# Patient Record
Sex: FEMALE | URBAN - METROPOLITAN AREA
[De-identification: names, ages, dates, MRNs, and addresses within clinical notes are randomized per-mention and may not be internally consistent; named-entity substitution may affect disease eponyms.]

---

## 2021-01-11 ENCOUNTER — TELEPHONE ENCOUNTER (OUTPATIENT)
Dept: URBAN - METROPOLITAN AREA CLINIC 98 | Facility: CLINIC | Age: 41
End: 2021-01-11

## 2021-01-12 ENCOUNTER — TELEPHONE ENCOUNTER (OUTPATIENT)
Dept: URBAN - METROPOLITAN AREA CLINIC 98 | Facility: CLINIC | Age: 41
End: 2021-01-12

## 2021-01-13 ENCOUNTER — OFFICE VISIT (OUTPATIENT)
Dept: URBAN - METROPOLITAN AREA TELEHEALTH 2 | Facility: TELEHEALTH | Age: 41
End: 2021-01-13
Payer: MEDICARE

## 2021-01-13 DIAGNOSIS — K57.92 DIVERTICULITIS: ICD-10-CM

## 2021-01-13 DIAGNOSIS — K57.32 DIVERTICULITIS LARGE INTESTINE: ICD-10-CM

## 2021-01-13 DIAGNOSIS — Z12.11 COLON CANCER SCREENING: ICD-10-CM

## 2021-01-13 PROCEDURE — G8482 FLU IMMUNIZE ORDER/ADMIN: HCPCS | Performed by: INTERNAL MEDICINE

## 2021-01-13 PROCEDURE — G8427 DOCREV CUR MEDS BY ELIG CLIN: HCPCS | Performed by: INTERNAL MEDICINE

## 2021-01-13 PROCEDURE — G9903 PT SCRN TBCO ID AS NON USER: HCPCS | Performed by: INTERNAL MEDICINE

## 2021-01-13 PROCEDURE — 3017F COLORECTAL CA SCREEN DOC REV: CPT | Performed by: INTERNAL MEDICINE

## 2021-01-13 PROCEDURE — 99214 OFFICE O/P EST MOD 30 MIN: CPT | Performed by: INTERNAL MEDICINE

## 2021-01-13 PROCEDURE — G9622 NO UNHEAL ETOH USER: HCPCS | Performed by: INTERNAL MEDICINE

## 2021-01-13 PROCEDURE — G8417 CALC BMI ABV UP PARAM F/U: HCPCS | Performed by: INTERNAL MEDICINE

## 2021-01-13 PROCEDURE — 1036F TOBACCO NON-USER: CPT | Performed by: INTERNAL MEDICINE

## 2021-01-13 RX ORDER — MOXIFLOXACIN HYDROCHLORIDE 400 MG/1
1 TABLET TABLET, FILM COATED ORAL ONCE A DAY
Qty: 10 | OUTPATIENT
Start: 2021-01-13 | End: 2021-01-23

## 2021-01-13 RX ORDER — ONDANSETRON HYDROCHLORIDE 4 MG/1
TABLET, FILM COATED ORAL
Qty: 0 | Refills: 0 | Status: ACTIVE | COMMUNITY
Start: 1900-01-01

## 2021-01-13 NOTE — HPI-OTHER HISTORIES
f/u visit. Recently at [facility] with recurrent diverticulitis.   ED records reviewed Prior episodes reviewed.  Difficulty with multiple antibiotics in the past. She was started on Augmentin in the ED, however, she reports an allergic reaction to this in the past Per my records, she did respond to Avalox  in the past Reviewed prior colonoscopy with poor prep

## 2021-01-13 NOTE — EXAM-PHYSICAL EXAM
[facility] CT SCAN 1/9/21: IMPRESSION:  1. Early acute, uncomplicated diverticulitis of the distal descending colon/proximal sigmoid colon within the lower abdomen, just left of midline. 2. The appendix is normal.

## 2021-01-17 ENCOUNTER — WEB ENCOUNTER (OUTPATIENT)
Dept: URBAN - METROPOLITAN AREA CLINIC 98 | Facility: CLINIC | Age: 41
End: 2021-01-17

## 2021-01-19 ENCOUNTER — WEB ENCOUNTER (OUTPATIENT)
Dept: URBAN - METROPOLITAN AREA CLINIC 98 | Facility: CLINIC | Age: 41
End: 2021-01-19

## 2021-01-21 ENCOUNTER — TELEPHONE ENCOUNTER (OUTPATIENT)
Dept: URBAN - METROPOLITAN AREA CLINIC 98 | Facility: CLINIC | Age: 41
End: 2021-01-21

## 2021-01-25 ENCOUNTER — TELEPHONE ENCOUNTER (OUTPATIENT)
Dept: URBAN - METROPOLITAN AREA CLINIC 98 | Facility: CLINIC | Age: 41
End: 2021-01-25

## 2021-02-17 ENCOUNTER — LAB OUTSIDE AN ENCOUNTER (OUTPATIENT)
Dept: URBAN - METROPOLITAN AREA TELEHEALTH 2 | Facility: TELEHEALTH | Age: 41
End: 2021-02-17

## 2021-02-17 ENCOUNTER — OFFICE VISIT (OUTPATIENT)
Dept: URBAN - METROPOLITAN AREA TELEHEALTH 2 | Facility: TELEHEALTH | Age: 41
End: 2021-02-17
Payer: MEDICARE

## 2021-02-17 ENCOUNTER — WEB ENCOUNTER (OUTPATIENT)
Dept: URBAN - METROPOLITAN AREA CLINIC 98 | Facility: CLINIC | Age: 41
End: 2021-02-17

## 2021-02-17 DIAGNOSIS — K57.92 DIVERTICULITIS: ICD-10-CM

## 2021-02-17 DIAGNOSIS — Z12.11 COLON CANCER SCREENING: ICD-10-CM

## 2021-02-17 PROCEDURE — G8482 FLU IMMUNIZE ORDER/ADMIN: HCPCS | Performed by: INTERNAL MEDICINE

## 2021-02-17 PROCEDURE — 1036F TOBACCO NON-USER: CPT | Performed by: INTERNAL MEDICINE

## 2021-02-17 PROCEDURE — G9903 PT SCRN TBCO ID AS NON USER: HCPCS | Performed by: INTERNAL MEDICINE

## 2021-02-17 PROCEDURE — 3017F COLORECTAL CA SCREEN DOC REV: CPT | Performed by: INTERNAL MEDICINE

## 2021-02-17 PROCEDURE — G8417 CALC BMI ABV UP PARAM F/U: HCPCS | Performed by: INTERNAL MEDICINE

## 2021-02-17 PROCEDURE — 99214 OFFICE O/P EST MOD 30 MIN: CPT | Performed by: INTERNAL MEDICINE

## 2021-02-17 PROCEDURE — G8427 DOCREV CUR MEDS BY ELIG CLIN: HCPCS | Performed by: INTERNAL MEDICINE

## 2021-02-17 RX ORDER — ONDANSETRON HYDROCHLORIDE 4 MG/1
TABLET, FILM COATED ORAL
Qty: 0 | Refills: 0 | Status: ACTIVE | COMMUNITY
Start: 1900-01-01

## 2021-02-17 NOTE — HPI-OTHER HISTORIES
f/u visit. Recently at [facility] with recurrent diverticulitis.   ED records reviewed Prior episodes reviewed.  Difficulty with multiple antibiotics in the past. She was started on Augmentin in the ED, however, she reports an allergic reaction to this in the past Per my records, she did respond to Avalox  in the past Reviewed prior colonoscopy with poor prep

## 2021-02-18 ENCOUNTER — WEB ENCOUNTER (OUTPATIENT)
Dept: URBAN - METROPOLITAN AREA CLINIC 98 | Facility: CLINIC | Age: 41
End: 2021-02-18

## 2021-02-19 ENCOUNTER — WEB ENCOUNTER (OUTPATIENT)
Dept: URBAN - METROPOLITAN AREA CLINIC 98 | Facility: CLINIC | Age: 41
End: 2021-02-19

## 2021-04-12 ENCOUNTER — OFFICE VISIT (OUTPATIENT)
Dept: URBAN - METROPOLITAN AREA SURGERY CENTER 18 | Facility: SURGERY CENTER | Age: 41
End: 2021-04-12
Payer: MEDICARE

## 2021-04-12 DIAGNOSIS — K63.89 BACTERIAL OVERGROWTH SYNDROME: ICD-10-CM

## 2021-04-12 DIAGNOSIS — Z87.19 H/O ACUTE PANCREATITIS: ICD-10-CM

## 2021-04-12 PROCEDURE — 45380 COLONOSCOPY AND BIOPSY: CPT | Performed by: INTERNAL MEDICINE

## 2021-04-12 PROCEDURE — G8907 PT DOC NO EVENTS ON DISCHARG: HCPCS | Performed by: INTERNAL MEDICINE

## 2022-03-29 ENCOUNTER — OFFICE VISIT (OUTPATIENT)
Dept: URBAN - METROPOLITAN AREA CLINIC 98 | Facility: CLINIC | Age: 42
End: 2022-03-29
Payer: MEDICARE

## 2022-03-29 ENCOUNTER — LAB OUTSIDE AN ENCOUNTER (OUTPATIENT)
Dept: URBAN - METROPOLITAN AREA CLINIC 98 | Facility: CLINIC | Age: 42
End: 2022-03-29

## 2022-03-29 VITALS
SYSTOLIC BLOOD PRESSURE: 112 MMHG | HEART RATE: 69 BPM | BODY MASS INDEX: 36.61 KG/M2 | DIASTOLIC BLOOD PRESSURE: 69 MMHG | TEMPERATURE: 97.5 F | WEIGHT: 227.8 LBS | HEIGHT: 66 IN

## 2022-03-29 DIAGNOSIS — K57.92 DIVERTICULITIS: ICD-10-CM

## 2022-03-29 DIAGNOSIS — Z12.11 COLON CANCER SCREENING: ICD-10-CM

## 2022-03-29 DIAGNOSIS — R10.30 LOWER ABDOMINAL PAIN: ICD-10-CM

## 2022-03-29 PROBLEM — 54586004: Status: ACTIVE | Noted: 2022-03-29

## 2022-03-29 PROBLEM — 307496006 DIVERTICULITIS: Status: ACTIVE | Noted: 2021-01-13

## 2022-03-29 PROCEDURE — G8482 FLU IMMUNIZE ORDER/ADMIN: HCPCS | Performed by: INTERNAL MEDICINE

## 2022-03-29 PROCEDURE — 99214 OFFICE O/P EST MOD 30 MIN: CPT | Performed by: INTERNAL MEDICINE

## 2022-03-29 PROCEDURE — 3017F COLORECTAL CA SCREEN DOC REV: CPT | Performed by: INTERNAL MEDICINE

## 2022-03-29 PROCEDURE — G8417 CALC BMI ABV UP PARAM F/U: HCPCS | Performed by: INTERNAL MEDICINE

## 2022-03-29 PROCEDURE — G9903 PT SCRN TBCO ID AS NON USER: HCPCS | Performed by: INTERNAL MEDICINE

## 2022-03-29 PROCEDURE — G8427 DOCREV CUR MEDS BY ELIG CLIN: HCPCS | Performed by: INTERNAL MEDICINE

## 2022-03-29 RX ORDER — ONDANSETRON HYDROCHLORIDE 4 MG/1
TABLET, FILM COATED ORAL
Qty: 0 | Refills: 0 | Status: ACTIVE | COMMUNITY
Start: 1900-01-01

## 2022-03-29 NOTE — HPI-OTHER HISTORIES
f/u visit. Colonoscopy 4/2021 with pan-diverticulosis and lipoma She recently had some RLQ abd pain She took Aloe Vera, and the pain moved to the LLQ She now feels like pain starts in RLQ and moves to LLQ Pain does improve after a bowel movement She does have a bowel movement every day, actually 3-4/day No blood or mucus    . PRIOR VISIT: Recently at [facility] with recurrent diverticulitis.   ED records reviewed Prior episodes reviewed.  Difficulty with multiple antibiotics in the past. She was started on Augmentin in the ED, however, she reports an allergic reaction to this in the past Per my records, she did respond to Avalox  in the past Reviewed prior colonoscopy with poor prep

## 2022-03-30 LAB
A/G RATIO: 2.2
ALBUMIN: 4.6
ALKALINE PHOSPHATASE: 58
ALT (SGPT): 20
AST (SGOT): 19
BASO (ABSOLUTE): 0
BASOS: 1
BILIRUBIN, TOTAL: 0.4
BUN/CREATININE RATIO: 15
BUN: 12
C-REACTIVE PROTEIN, QUANT: 3
CALCIUM: 9.7
CARBON DIOXIDE, TOTAL: 19
CHLORIDE: 104
CREATININE: 0.82
EGFR: 91
EOS (ABSOLUTE): 0.2
EOS: 5
GLOBULIN, TOTAL: 2.1
GLUCOSE: 90
HEMATOCRIT: 40.2
HEMATOLOGY COMMENTS:: (no result)
HEMOGLOBIN: 12.6
IMMATURE CELLS: (no result)
IMMATURE GRANS (ABS): 0
IMMATURE GRANULOCYTES: 0
LYMPHS (ABSOLUTE): 1.7
LYMPHS: 43
MCH: 25.7
MCHC: 31.3
MCV: 82
MONOCYTES(ABSOLUTE): 0.5
MONOCYTES: 12
NEUTROPHILS (ABSOLUTE): 1.5
NEUTROPHILS: 39
NRBC: (no result)
PLATELETS: 287
POTASSIUM: 4.3
PROTEIN, TOTAL: 6.7
RBC: 4.9
RDW: 13.4
SODIUM: 149
WBC: 3.9

## 2022-04-15 ENCOUNTER — TELEPHONE ENCOUNTER (OUTPATIENT)
Dept: URBAN - METROPOLITAN AREA CLINIC 98 | Facility: CLINIC | Age: 42
End: 2022-04-15

## 2022-04-20 ENCOUNTER — TELEPHONE ENCOUNTER (OUTPATIENT)
Dept: URBAN - METROPOLITAN AREA CLINIC 98 | Facility: CLINIC | Age: 42
End: 2022-04-20

## 2023-01-16 ENCOUNTER — APPOINTMENT (RX ONLY)
Dept: URBAN - METROPOLITAN AREA CLINIC 33 | Facility: CLINIC | Age: 43
Setting detail: DERMATOLOGY
End: 2023-01-16

## 2023-01-16 DIAGNOSIS — L81.0 POSTINFLAMMATORY HYPERPIGMENTATION: ICD-10-CM

## 2023-01-16 PROCEDURE — 99202 OFFICE O/P NEW SF 15 MIN: CPT

## 2023-01-16 PROCEDURE — ? TREATMENT REGIMEN

## 2023-01-16 PROCEDURE — ? COUNSELING

## 2023-01-16 ASSESSMENT — LOCATION SIMPLE DESCRIPTION DERM
LOCATION SIMPLE: RIGHT SUPERIOR EYELID
LOCATION SIMPLE: LEFT INFERIOR EYELID
LOCATION SIMPLE: LEFT SUPERIOR EYELID
LOCATION SIMPLE: RIGHT INFERIOR EYELID

## 2023-01-16 ASSESSMENT — LOCATION DETAILED DESCRIPTION DERM
LOCATION DETAILED: RIGHT LATERAL SUPERIOR EYELID
LOCATION DETAILED: RIGHT MEDIAL INFERIOR EYELID
LOCATION DETAILED: LEFT LATERAL INFERIOR EYELID
LOCATION DETAILED: LEFT MEDIAL SUPERIOR EYELID

## 2023-01-16 ASSESSMENT — LOCATION ZONE DERM: LOCATION ZONE: EYELID

## 2023-01-16 NOTE — PROCEDURE: TREATMENT REGIMEN
Detail Level: Zone
Plan: Will refer to Dr. Gianluca Alvarez at [facility] due to patient describing irritation which is within her actual eye. Skin shows signs of PIH due to constant rubbing due to pruritic. No evidence of skin rash. RTC PRN

## 2023-01-16 NOTE — HPI: RASH
What Type Of Note Output Would You Prefer (Optional)?: Bullet Format
How Severe Is Your Rash?: mild
Is This A New Presentation, Or A Follow-Up?: Rash
Additional History: New patient is here today for Eye irritation that has been occurring for 6 months. Patient states yesterday morning her eye was shut completely closed, red and itchy. Patient states the irritation is inside of her eye, never her skin. Patient states she has gone to [facility] multiple times and was told she had conjunctivitis. Patient was prescribed eye cream and eye drops. Patient saw Dr. Angelito Orozco and was told that her right eye is enlarged and irritated within her eye. Patient also stated she was to,d to get a second opinion from a dermatologist for her condition. Patient states to get her records from her Dr. Angelito Orozco, because she does not know when she went, nor what was prescribed. Patient desires further evaluation and treatment. \\n

## 2023-12-12 ENCOUNTER — LAB OUTSIDE AN ENCOUNTER (OUTPATIENT)
Dept: URBAN - METROPOLITAN AREA CLINIC 98 | Facility: CLINIC | Age: 43
End: 2023-12-12

## 2023-12-12 ENCOUNTER — OFFICE VISIT (OUTPATIENT)
Dept: URBAN - METROPOLITAN AREA CLINIC 98 | Facility: CLINIC | Age: 43
End: 2023-12-12
Payer: MEDICARE

## 2023-12-12 ENCOUNTER — DASHBOARD ENCOUNTERS (OUTPATIENT)
Age: 43
End: 2023-12-12

## 2023-12-12 VITALS
BODY MASS INDEX: 38.12 KG/M2 | TEMPERATURE: 97.3 F | HEART RATE: 83 BPM | HEIGHT: 66 IN | DIASTOLIC BLOOD PRESSURE: 63 MMHG | SYSTOLIC BLOOD PRESSURE: 100 MMHG | WEIGHT: 237.2 LBS

## 2023-12-12 DIAGNOSIS — Z12.11 COLON CANCER SCREENING: ICD-10-CM

## 2023-12-12 DIAGNOSIS — R10.30 LOWER ABDOMINAL PAIN: ICD-10-CM

## 2023-12-12 DIAGNOSIS — K57.92 DIVERTICULITIS: ICD-10-CM

## 2023-12-12 PROCEDURE — 99214 OFFICE O/P EST MOD 30 MIN: CPT | Performed by: INTERNAL MEDICINE

## 2023-12-12 RX ORDER — LEVOCETIRIZINE DIHYDROCHLORIDE 5 MG/1
1 TABLET IN THE EVENING TABLET, FILM COATED ORAL ONCE A DAY
Status: ACTIVE | COMMUNITY

## 2023-12-12 RX ORDER — LEVOFLOXACIN 500 MG/1
1 TABLET TABLET, FILM COATED ORAL ONCE A DAY
Status: ACTIVE | COMMUNITY

## 2023-12-12 RX ORDER — LEVOCETIRIZINE DIHYDROCHLORIDE 5 MG/1
TABLET, FILM COATED ORAL
Qty: 30 TABLET | Status: ACTIVE | COMMUNITY

## 2023-12-12 RX ORDER — FEXOFENADINE HYDROCHLORIDE 180 MG/1
1 TABLET SWALLOW WHOLE WITH WATER; DO NOT TAKE WITH FRUIT JUICES TABLET ORAL ONCE A DAY
Status: ACTIVE | COMMUNITY

## 2023-12-12 RX ORDER — ONDANSETRON HYDROCHLORIDE 4 MG/1
TABLET, FILM COATED ORAL
Qty: 0 | Refills: 0 | Status: ACTIVE | COMMUNITY
Start: 1900-01-01

## 2023-12-12 RX ORDER — FEXOFENADINE HYDROCHLORIDE 180 MG/1
TAKE 1 TABLET (180 MG TOTAL) BY MOUTH IN THE MORNING TABLET ORAL
Qty: 30 EACH | Refills: 3 | Status: ACTIVE | COMMUNITY

## 2023-12-12 RX ORDER — MOXIFLOXACIN HYDROCHLORIDE 400 MG/1
1 TABLET TABLET, FILM COATED ORAL ONCE A DAY
Qty: 10 TABLET | Refills: 0 | OUTPATIENT
Start: 2023-12-12 | End: 2023-12-22

## 2023-12-12 NOTE — HPI-OTHER HISTORIES
f/u visit. She had recurrent pain starting and Thanksgiving Sharp rt sided pain that goes across lower abdomen . PRIOR VISIT: Colonoscopy 4/2021 with pan-diverticulosis and lipoma She recently had some RLQ abd pain She took Aloe Vera, and the pain moved to the LLQ She now feels like pain starts in RLQ and moves to LLQ Pain does improve after a bowel movement She does have a bowel movement every day, actually 3-4/day No blood or mucus    . PRIOR VISIT: Recently at [facility] with recurrent diverticulitis.   ED records reviewed Prior episodes reviewed.  Difficulty with multiple antibiotics in the past. She was started on Augmentin in the ED, however, she reports an allergic reaction to this in the past Per my records, she did respond to Avalox  in the past Reviewed prior colonoscopy with poor prep

## 2023-12-13 ENCOUNTER — TELEPHONE ENCOUNTER (OUTPATIENT)
Dept: URBAN - METROPOLITAN AREA CLINIC 98 | Facility: CLINIC | Age: 43
End: 2023-12-13

## 2024-08-28 ENCOUNTER — OFFICE VISIT (OUTPATIENT)
Dept: URBAN - METROPOLITAN AREA TELEHEALTH 2 | Facility: TELEHEALTH | Age: 44
End: 2024-08-28
Payer: MEDICARE

## 2024-08-28 DIAGNOSIS — R31.0 GROSS HEMATURIA: ICD-10-CM

## 2024-08-28 DIAGNOSIS — K57.52 DIVERTICULITIS OF BOTH SMALL AND LARGE INTESTINE: ICD-10-CM

## 2024-08-28 DIAGNOSIS — N93.9 ABNORMAL UTERINE BLEEDING (AUB): ICD-10-CM

## 2024-08-28 DIAGNOSIS — R10.30 LOWER ABDOMINAL PAIN: ICD-10-CM

## 2024-08-28 PROBLEM — 44991000119100: Status: ACTIVE | Noted: 2024-08-28

## 2024-08-28 PROBLEM — 197941005: Status: ACTIVE | Noted: 2024-08-28

## 2024-08-28 PROCEDURE — 99214 OFFICE O/P EST MOD 30 MIN: CPT | Performed by: INTERNAL MEDICINE

## 2024-08-28 RX ORDER — ONDANSETRON HYDROCHLORIDE 4 MG/1
TABLET, FILM COATED ORAL
Qty: 0 | Refills: 0 | Status: ACTIVE | COMMUNITY
Start: 1900-01-01

## 2024-08-28 RX ORDER — FEXOFENADINE HYDROCHLORIDE 180 MG/1
1 TABLET SWALLOW WHOLE WITH WATER; DO NOT TAKE WITH FRUIT JUICES TABLET ORAL ONCE A DAY
Status: ACTIVE | COMMUNITY

## 2024-08-28 RX ORDER — LEVOCETIRIZINE DIHYDROCHLORIDE 5 MG/1
TABLET, FILM COATED ORAL
Qty: 30 TABLET | Status: ACTIVE | COMMUNITY

## 2024-08-28 RX ORDER — FEXOFENADINE HYDROCHLORIDE 180 MG/1
TAKE 1 TABLET (180 MG TOTAL) BY MOUTH IN THE MORNING TABLET ORAL
Qty: 30 EACH | Refills: 3 | Status: ACTIVE | COMMUNITY

## 2024-08-28 RX ORDER — LEVOFLOXACIN 500 MG/1
1 TABLET TABLET, FILM COATED ORAL ONCE A DAY
Status: ACTIVE | COMMUNITY

## 2024-08-28 RX ORDER — LEVOCETIRIZINE DIHYDROCHLORIDE 5 MG/1
1 TABLET IN THE EVENING TABLET, FILM COATED ORAL ONCE A DAY
Status: ACTIVE | COMMUNITY

## 2024-08-28 NOTE — HPI-OTHER HISTORIES
f/u visit. Went to [facility] yesterday with abd pain CT scan reviewed without inflammation UA with apparent UTI Discharged on Bactrim and Toradol Continued blood in urine and vaginal bleeding  . PRIOR VISIT: She had recurrent pain starting and Thanksgiving Sharp rt sided pain that goes across lower abdomen . PRIOR VISIT: Colonoscopy 4/2021 with pan-diverticulosis and lipoma She recently had some RLQ abd pain She took Aloe Vera, and the pain moved to the LLQ She now feels like pain starts in RLQ and moves to LLQ Pain does improve after a bowel movement She does have a bowel movement every day, actually 3-4/day No blood or mucus    . PRIOR VISIT: Recently at [facility] with recurrent diverticulitis.   ED records reviewed Prior episodes reviewed.  Difficulty with multiple antibiotics in the past. She was started on Augmentin in the ED, however, she reports an allergic reaction to this in the past Per my records, she did respond to Avalox  in the past Reviewed prior colonoscopy with poor prep

## 2024-11-01 ENCOUNTER — WEB ENCOUNTER (OUTPATIENT)
Dept: URBAN - METROPOLITAN AREA CLINIC 98 | Facility: CLINIC | Age: 44
End: 2024-11-01

## 2024-11-05 ENCOUNTER — OFFICE VISIT (OUTPATIENT)
Dept: URBAN - METROPOLITAN AREA CLINIC 98 | Facility: CLINIC | Age: 44
End: 2024-11-05

## 2024-11-12 ENCOUNTER — OFFICE VISIT (OUTPATIENT)
Dept: URBAN - METROPOLITAN AREA CLINIC 98 | Facility: CLINIC | Age: 44
End: 2024-11-12

## 2024-11-12 RX ORDER — LEVOCETIRIZINE DIHYDROCHLORIDE 5 MG/1
1 TABLET IN THE EVENING TABLET, FILM COATED ORAL ONCE A DAY
Status: ACTIVE | COMMUNITY

## 2024-11-12 RX ORDER — FEXOFENADINE HYDROCHLORIDE 180 MG/1
TAKE 1 TABLET (180 MG TOTAL) BY MOUTH IN THE MORNING TABLET ORAL
Qty: 30 EACH | Refills: 3 | Status: ACTIVE | COMMUNITY

## 2024-11-12 RX ORDER — ONDANSETRON HYDROCHLORIDE 4 MG/1
TABLET, FILM COATED ORAL
Qty: 0 | Refills: 0 | Status: ACTIVE | COMMUNITY
Start: 1900-01-01

## 2024-11-12 RX ORDER — LEVOCETIRIZINE DIHYDROCHLORIDE 5 MG/1
TABLET, FILM COATED ORAL
Qty: 30 TABLET | Status: ACTIVE | COMMUNITY

## 2024-11-12 RX ORDER — LEVOFLOXACIN 500 MG/1
1 TABLET TABLET, FILM COATED ORAL ONCE A DAY
Status: ACTIVE | COMMUNITY

## 2024-11-12 RX ORDER — FEXOFENADINE HYDROCHLORIDE 180 MG/1
1 TABLET SWALLOW WHOLE WITH WATER; DO NOT TAKE WITH FRUIT JUICES TABLET ORAL ONCE A DAY
Status: ACTIVE | COMMUNITY

## 2025-01-02 ENCOUNTER — TELEPHONE ENCOUNTER (OUTPATIENT)
Dept: URBAN - METROPOLITAN AREA CLINIC 98 | Facility: CLINIC | Age: 45
End: 2025-01-02